# Patient Record
Sex: FEMALE | Race: OTHER | HISPANIC OR LATINO
[De-identification: names, ages, dates, MRNs, and addresses within clinical notes are randomized per-mention and may not be internally consistent; named-entity substitution may affect disease eponyms.]

---

## 2017-07-31 ENCOUNTER — TRANSCRIPTION ENCOUNTER (OUTPATIENT)
Age: 57
End: 2017-07-31

## 2018-06-04 ENCOUNTER — TRANSCRIPTION ENCOUNTER (OUTPATIENT)
Age: 58
End: 2018-06-04

## 2019-09-30 ENCOUNTER — TRANSCRIPTION ENCOUNTER (OUTPATIENT)
Age: 59
End: 2019-09-30

## 2019-11-27 ENCOUNTER — APPOINTMENT (OUTPATIENT)
Dept: OBGYN | Facility: CLINIC | Age: 59
End: 2019-11-27
Payer: COMMERCIAL

## 2019-11-27 VITALS
TEMPERATURE: 97.9 F | BODY MASS INDEX: 30.63 KG/M2 | SYSTOLIC BLOOD PRESSURE: 120 MMHG | HEIGHT: 60 IN | DIASTOLIC BLOOD PRESSURE: 70 MMHG | WEIGHT: 156 LBS

## 2019-11-27 DIAGNOSIS — N95.2 POSTMENOPAUSAL ATROPHIC VAGINITIS: ICD-10-CM

## 2019-11-27 DIAGNOSIS — Z01.419 ENCOUNTER FOR GYNECOLOGICAL EXAMINATION (GENERAL) (ROUTINE) W/OUT ABNORMAL FINDINGS: ICD-10-CM

## 2019-11-27 PROCEDURE — 99386 PREV VISIT NEW AGE 40-64: CPT

## 2019-11-27 PROCEDURE — 82270 OCCULT BLOOD FECES: CPT

## 2019-11-27 NOTE — CHIEF COMPLAINT
[Initial Visit] : initial GYN visit [FreeTextEntry1] : Patient is a 59-year-old female presents for a routine annual gynecologic examination. Patient has no complaints. Patient's last mammogram was one week ago. Patient's last bone density 4 years ago.

## 2019-11-29 LAB — HPV HIGH+LOW RISK DNA PNL CVX: NOT DETECTED

## 2021-01-11 ENCOUNTER — OUTPATIENT (OUTPATIENT)
Dept: OUTPATIENT SERVICES | Facility: HOSPITAL | Age: 61
LOS: 1 days | End: 2021-01-11
Payer: COMMERCIAL

## 2021-01-11 DIAGNOSIS — Z20.828 CONTACT WITH AND (SUSPECTED) EXPOSURE TO OTHER VIRAL COMMUNICABLE DISEASES: ICD-10-CM

## 2021-01-11 LAB — SARS-COV-2 RNA SPEC QL NAA+PROBE: SIGNIFICANT CHANGE UP

## 2021-01-11 PROCEDURE — C9803: CPT

## 2021-01-11 PROCEDURE — U0003: CPT

## 2021-01-11 PROCEDURE — U0005: CPT

## 2021-01-12 DIAGNOSIS — Z20.828 CONTACT WITH AND (SUSPECTED) EXPOSURE TO OTHER VIRAL COMMUNICABLE DISEASES: ICD-10-CM

## 2024-02-23 ENCOUNTER — APPOINTMENT (OUTPATIENT)
Dept: ORTHOPEDIC SURGERY | Facility: CLINIC | Age: 64
End: 2024-02-23
Payer: COMMERCIAL

## 2024-02-23 VITALS — HEIGHT: 60 IN | BODY MASS INDEX: 32.2 KG/M2 | WEIGHT: 164 LBS

## 2024-02-23 DIAGNOSIS — M17.12 UNILATERAL PRIMARY OSTEOARTHRITIS, LEFT KNEE: ICD-10-CM

## 2024-02-23 PROCEDURE — J3490M: CUSTOM

## 2024-02-23 PROCEDURE — 73564 X-RAY EXAM KNEE 4 OR MORE: CPT | Mod: LT

## 2024-02-23 PROCEDURE — 99204 OFFICE O/P NEW MOD 45 MIN: CPT | Mod: 25

## 2024-02-23 PROCEDURE — 20610 DRAIN/INJ JOINT/BURSA W/O US: CPT | Mod: LT

## 2024-02-27 NOTE — HISTORY OF PRESENT ILLNESS
[8] : 8 [de-identified] : 2/23/24 ANALY BABCOCK  is a 63 year F here today for left knee pain.  Pain has been present for 3 months and is located medial and lateral join tlines.      Injury: no  Instability: yes  Clicking/popping: yes  Does knee lock up: no Swelling/effusions: yes Exacerbating activities/motions: yes  Previous treatment: Surgery: no NSAIDs: no PT: no Injections: no Brace: no Activity mods: no   Occupation: [ ] Recreational Activities: [no ]

## 2024-02-27 NOTE — DISCUSSION/SUMMARY
[de-identified] : Lillie has mild arthritic change in her left knee and a possible meniscus tear given the above findings.  I recommended a steroid injection which was performed today, meloxicam, and physical therapy.  She will follow-up in 4 weeks if she is not feeling better.  She does have some pain radiating down the anterior and lateral part of her thigh that stops at her knee and occasionally has numbness and tingling which could be coming from her back.  This should improve with physical therapy and anti-inflammatory as symptoms are quite mild right now.  If she has the same pain in 4 weeks if the injection does not help her at all we will get x-rays of her lower back and pursue that as a possible pain generator.  All of her questions were answered she is in agreement with this plan.

## 2024-02-27 NOTE — IMAGING
[Left] : left knee [All Views] : anteroposterior, lateral, skyline, and anteroposterior standing [Mild tricompartmental OA medial narrowing] : Mild tricompartmental OA medial narrowing [There are no fractures, subluxations or dislocations. No significant abnormalities are seen] : There are no fractures, subluxations or dislocations. No significant abnormalities are seen [de-identified] : Gait: Non-antalgic Alignment: Neutral Scars: None   L Knee: Tenderness: medial and lateral joint line ROM: 0-120 degrees Crepitus: None Effusion: Pos Warmth: None   Meniscal Signs: Pain on terminal extension: neg Pain on terminal flexion: pos McMurrays: pos - pain and crepitus Thessaly's: Neg   Ligament Exam: Lachman: neg Post Drawer: neg Valgus stress: neg at 0 and 30 degrees Varus stress: neg at 0 and 30 degrees Pivot shift: neg Dial test: neg at 30 degrees, neg at 90 degrees   Strength: Quads: 5/5 Hamstrin/5 DF/PF/EHL 5/5   Sensation grossly intact in all dermatomes, DP/PT 2+, brisk capillary refill distally

## 2024-02-27 NOTE — PROCEDURE
[FreeTextEntry1] : L knee CSI [FreeTextEntry2] : pain [FreeTextEntry3] :  Risks and benefits of the injection were discussed with the patient including infection, bleeding, allergic reactions, worsening of symptoms, and possible neurovascular damage.  They stated understanding and were agreeable to proceed.  An inferolateral portal was marked at the junction of the inferior and lateral borders of the patella and the L knee was steriley prepped with betadine and ETOH.  The inferolateral portal was then used to inject 40mg kenalog and 4cc marcaine.  The injection site was cleaned with ETOH and a sterile bandaid was applied.  Patient tolerated the procedure well without any apparent complications.  Counseled on concerning signs/symptoms after injection and to call office should any problems arise.

## 2024-03-25 ENCOUNTER — NON-APPOINTMENT (OUTPATIENT)
Age: 64
End: 2024-03-25

## 2024-03-25 ENCOUNTER — APPOINTMENT (OUTPATIENT)
Dept: DERMATOLOGY | Facility: CLINIC | Age: 64
End: 2024-03-25
Payer: COMMERCIAL

## 2024-03-25 DIAGNOSIS — L81.4 OTHER MELANIN HYPERPIGMENTATION: ICD-10-CM

## 2024-03-25 DIAGNOSIS — L82.0 INFLAMED SEBORRHEIC KERATOSIS: ICD-10-CM

## 2024-03-25 DIAGNOSIS — D18.01 HEMANGIOMA OF SKIN AND SUBCUTANEOUS TISSUE: ICD-10-CM

## 2024-03-25 DIAGNOSIS — D22.9 MELANOCYTIC NEVI, UNSPECIFIED: ICD-10-CM

## 2024-03-25 PROCEDURE — 17110 DESTRUCTION B9 LES UP TO 14: CPT

## 2024-03-25 PROCEDURE — 99203 OFFICE O/P NEW LOW 30 MIN: CPT | Mod: 25

## 2024-03-25 NOTE — ASSESSMENT
[FreeTextEntry1] : Lentigines:  Benign; no treatment necessary  Nevi:  Benign; no treatment necessary  Alcira angiomas:  Benign; no treatment necessary  Inflamed SKs: LN2 x 3

## 2024-03-25 NOTE — HISTORY OF PRESENT ILLNESS
[FreeTextEntry1] : Spot of concern [de-identified] : Patient here for spots of concern on face and chest that are getting larger and irritated. No personal or family hx of skin cancer.

## 2024-03-25 NOTE — PHYSICAL EXAM
[Oriented x 3] : ~L oriented x 3 [Alert] : alert [Well Nourished] : well nourished [FreeTextEntry3] : Inflamed, waxy, keratotic papules of left temple and mid chest  Brown macules and papules of face, neck and chest

## 2024-06-24 ENCOUNTER — RX RENEWAL (OUTPATIENT)
Age: 64
End: 2024-06-24

## 2024-06-24 RX ORDER — MELOXICAM 15 MG/1
15 TABLET ORAL DAILY
Qty: 30 | Refills: 2 | Status: ACTIVE | COMMUNITY
Start: 2024-02-23 | End: 1900-01-01

## 2024-10-30 ENCOUNTER — RX RENEWAL (OUTPATIENT)
Age: 64
End: 2024-10-30